# Patient Record
Sex: FEMALE | Race: WHITE | Employment: FULL TIME | URBAN - METROPOLITAN AREA
[De-identification: names, ages, dates, MRNs, and addresses within clinical notes are randomized per-mention and may not be internally consistent; named-entity substitution may affect disease eponyms.]

---

## 2017-05-10 ENCOUNTER — OFFICE VISIT (OUTPATIENT)
Dept: INTERNAL MEDICINE CLINIC | Age: 27
End: 2017-05-10

## 2017-05-10 VITALS
DIASTOLIC BLOOD PRESSURE: 56 MMHG | SYSTOLIC BLOOD PRESSURE: 104 MMHG | HEIGHT: 63 IN | RESPIRATION RATE: 16 BRPM | BODY MASS INDEX: 19.14 KG/M2 | HEART RATE: 60 BPM | WEIGHT: 108 LBS

## 2017-05-10 DIAGNOSIS — R59.9 ENLARGED LYMPH NODES: Primary | ICD-10-CM

## 2017-05-10 PROCEDURE — 99213 OFFICE O/P EST LOW 20 MIN: CPT | Performed by: INTERNAL MEDICINE

## 2017-05-10 RX ORDER — CEPHALEXIN 250 MG/1
250 CAPSULE ORAL 2 TIMES DAILY
Qty: 20 CAPSULE | Refills: 0 | Status: SHIPPED | OUTPATIENT
Start: 2017-05-10 | End: 2017-12-27 | Stop reason: ALTCHOICE

## 2017-05-10 ASSESSMENT — ENCOUNTER SYMPTOMS
SHORTNESS OF BREATH: 0
RHINORRHEA: 1

## 2017-07-17 ENCOUNTER — NURSE ONLY (OUTPATIENT)
Dept: INTERNAL MEDICINE CLINIC | Age: 27
End: 2017-07-17

## 2017-07-17 PROCEDURE — 86580 TB INTRADERMAL TEST: CPT | Performed by: INTERNAL MEDICINE

## 2017-07-26 ENCOUNTER — NURSE ONLY (OUTPATIENT)
Dept: INTERNAL MEDICINE CLINIC | Age: 27
End: 2017-07-26

## 2017-07-26 DIAGNOSIS — Z11.1 PPD SCREENING TEST: Primary | ICD-10-CM

## 2017-07-26 PROCEDURE — 86580 TB INTRADERMAL TEST: CPT | Performed by: INTERNAL MEDICINE

## 2017-12-27 ENCOUNTER — OFFICE VISIT (OUTPATIENT)
Dept: INTERNAL MEDICINE CLINIC | Age: 27
End: 2017-12-27

## 2017-12-27 ENCOUNTER — HOSPITAL ENCOUNTER (OUTPATIENT)
Dept: CT IMAGING | Age: 27
Discharge: HOME | End: 2017-12-27
Attending: INTERNAL MEDICINE | Admitting: INTERNAL MEDICINE

## 2017-12-27 VITALS
HEIGHT: 63 IN | BODY MASS INDEX: 20.38 KG/M2 | OXYGEN SATURATION: 99 % | WEIGHT: 115 LBS | DIASTOLIC BLOOD PRESSURE: 54 MMHG | SYSTOLIC BLOOD PRESSURE: 90 MMHG | HEART RATE: 87 BPM | RESPIRATION RATE: 16 BRPM

## 2017-12-27 DIAGNOSIS — R10.9 RIGHT FLANK PAIN: ICD-10-CM

## 2017-12-27 DIAGNOSIS — R10.31 RIGHT INGUINAL PAIN: ICD-10-CM

## 2017-12-27 DIAGNOSIS — R31.0 GROSS HEMATURIA: ICD-10-CM

## 2017-12-27 DIAGNOSIS — R10.32 LEFT INGUINAL PAIN: ICD-10-CM

## 2017-12-27 DIAGNOSIS — R10.9 RIGHT FLANK PAIN: Primary | ICD-10-CM

## 2017-12-27 DIAGNOSIS — R10.9 ABDOMINAL PAIN: ICD-10-CM

## 2017-12-27 DIAGNOSIS — R10.9 LEFT FLANK PAIN: ICD-10-CM

## 2017-12-27 PROCEDURE — 99213 OFFICE O/P EST LOW 20 MIN: CPT | Performed by: INTERNAL MEDICINE

## 2017-12-27 ASSESSMENT — ENCOUNTER SYMPTOMS
ABDOMINAL PAIN: 0
ANAL BLEEDING: 0
CONSTIPATION: 0
DIARRHEA: 0
NAUSEA: 0
BLOOD IN STOOL: 0

## 2017-12-27 NOTE — PROGRESS NOTES
Subjective:    cc:  bilat groin pain  Patient ID: Merline Mccabe is a 24 y.o. female. HPI  C/o intermittent bilat flank pain with radiation to bilat groins. No f/c. Does see blood in urine usually after an episode. Has had severe pain in the past.  No n/v. No dysuria. No frequency. Father with kidney stones. Pain usually right sided. Going on for months. Review of Systems   Constitutional: Negative for chills and fever. Gastrointestinal: Negative for abdominal pain, anal bleeding, blood in stool, constipation, diarrhea and nausea. Genitourinary: Positive for flank pain. Negative for dysuria and frequency. Objective:   Physical Exam   Constitutional: She appears well-developed and well-nourished. No distress. Cardiovascular: Normal rate, regular rhythm and normal heart sounds. Exam reveals no gallop and no friction rub. No murmur heard. Pulmonary/Chest: Effort normal and breath sounds normal. No respiratory distress. She has no wheezes. She has no rales. She exhibits no tenderness. Abdominal: Soft. Bowel sounds are normal. She exhibits no distension and no mass. There is no tenderness. There is no rebound and no guarding. Lymphadenopathy:        Right: No inguinal adenopathy present. Left: No inguinal adenopathy present. Skin: She is not diaphoretic. Assessment:      1. Right flank pain    2. Left flank pain    3. Right inguinal pain    4. Left inguinal pain    5. Gross hematuria            Plan:      Renetta Talbert was seen today for abdominal pain.     Diagnoses and all orders for this visit:    Right flank pain  -     CT ABDOMEN PELVIS W IV CONTRAST Additional Contrast? Oral; Future    Left flank pain  -     CT ABDOMEN PELVIS W IV CONTRAST Additional Contrast? Oral; Future    Right inguinal pain  -     CT ABDOMEN PELVIS W IV CONTRAST Additional Contrast? Oral; Future    Left inguinal pain  -     CT ABDOMEN PELVIS W IV CONTRAST Additional Contrast? Oral; Future    Gross

## 2018-01-04 ENCOUNTER — HOSPITAL ENCOUNTER (OUTPATIENT)
Dept: OTHER | Age: 28
Discharge: HOME | End: 2018-01-04
Attending: SURGERY | Admitting: SURGERY

## 2018-01-04 DIAGNOSIS — N20.0 RENAL CALCULUS: ICD-10-CM
